# Patient Record
Sex: FEMALE | Race: WHITE
[De-identification: names, ages, dates, MRNs, and addresses within clinical notes are randomized per-mention and may not be internally consistent; named-entity substitution may affect disease eponyms.]

---

## 2018-01-16 ENCOUNTER — HOSPITAL ENCOUNTER (OUTPATIENT)
Dept: HOSPITAL 33 - ED | Age: 18
Setting detail: OBSERVATION
LOS: 2 days | Discharge: HOME | End: 2018-01-18
Attending: GENERAL PRACTICE | Admitting: GENERAL PRACTICE
Payer: SELF-PAY

## 2018-01-16 DIAGNOSIS — F19.90: ICD-10-CM

## 2018-01-16 DIAGNOSIS — R74.8: ICD-10-CM

## 2018-01-16 DIAGNOSIS — G47.00: ICD-10-CM

## 2018-01-16 DIAGNOSIS — Z72.0: ICD-10-CM

## 2018-01-16 DIAGNOSIS — R56.9: ICD-10-CM

## 2018-01-16 DIAGNOSIS — T50.904A: Primary | ICD-10-CM

## 2018-01-16 PROCEDURE — 82962 GLUCOSE BLOOD TEST: CPT

## 2018-01-16 PROCEDURE — 36000 PLACE NEEDLE IN VEIN: CPT

## 2018-01-16 PROCEDURE — 81000 URINALYSIS NONAUTO W/SCOPE: CPT

## 2018-01-16 PROCEDURE — 83735 ASSAY OF MAGNESIUM: CPT

## 2018-01-16 PROCEDURE — 80053 COMPREHEN METABOLIC PANEL: CPT

## 2018-01-16 PROCEDURE — 87086 URINE CULTURE/COLONY COUNT: CPT

## 2018-01-16 PROCEDURE — 95812 EEG 41-60 MINUTES: CPT

## 2018-01-16 PROCEDURE — 90791 PSYCH DIAGNOSTIC EVALUATION: CPT

## 2018-01-16 PROCEDURE — 93268 ECG RECORD/REVIEW: CPT

## 2018-01-16 PROCEDURE — 82550 ASSAY OF CK (CPK): CPT

## 2018-01-16 PROCEDURE — 99285 EMERGENCY DEPT VISIT HI MDM: CPT

## 2018-01-16 PROCEDURE — 36415 COLL VENOUS BLD VENIPUNCTURE: CPT

## 2018-01-16 PROCEDURE — 80307 DRUG TEST PRSMV CHEM ANLYZR: CPT

## 2018-01-16 PROCEDURE — 93306 TTE W/DOPPLER COMPLETE: CPT

## 2018-01-16 PROCEDURE — 84703 CHORIONIC GONADOTROPIN ASSAY: CPT

## 2018-01-16 PROCEDURE — 96361 HYDRATE IV INFUSION ADD-ON: CPT

## 2018-01-16 PROCEDURE — 71046 X-RAY EXAM CHEST 2 VIEWS: CPT

## 2018-01-16 PROCEDURE — 85025 COMPLETE CBC W/AUTO DIFF WBC: CPT

## 2018-01-16 PROCEDURE — 96360 HYDRATION IV INFUSION INIT: CPT

## 2018-01-16 PROCEDURE — 74018 RADEX ABDOMEN 1 VIEW: CPT

## 2018-01-16 PROCEDURE — 93005 ELECTROCARDIOGRAM TRACING: CPT

## 2018-01-16 PROCEDURE — 84484 ASSAY OF TROPONIN QUANT: CPT

## 2018-01-17 LAB
ALBUMIN SERPL-MCNC: 5.2 G/DL (ref 3.4–5)
ALP SERPL-CCNC: 80 U/L (ref 46–116)
ALT SERPL-CCNC: 26 U/L (ref 12–78)
AMORPH CRY URNS QL MICRO: (no result) /HPF
AMPHETAMINES UR QL: (no result)
ANION GAP SERPL CALC-SCNC: 15.3 MEQ/L (ref 5–15)
APAP SPEC-MCNC: < 2 UG/ML (ref 10–30)
AST SERPL QL: 21 U/L (ref 15–37)
BARBITURATES UR QL: (no result)
BASOPHILS # BLD AUTO: 0.02 10*3/UL (ref 0–0.4)
BASOPHILS NFR BLD AUTO: 0.2 % (ref 0–0.4)
BENZODIAZ UR QL SCN: (no result)
BILIRUB BLD-MCNC: 0.2 MG/DL (ref 0.2–1)
BUN SERPL-MCNC: 9 MG/DL (ref 9–20)
CALCIUM SPEC-MCNC: 9.4 MG/DL (ref 8.5–10.1)
CHLORIDE SERPL-SCNC: 104 MEQ/L (ref 98–107)
CK SERPL-CCNC: 45 U/L (ref 26–192)
CO2 SERPL-SCNC: 24.6 MEQ/L (ref 21–32)
COCAINE UR QL SCN: (no result)
CREAT SERPL-MCNC: 0.89 MG/DL (ref 0.55–1.3)
EOSINOPHIL # BLD AUTO: 0.02 10*3/UL (ref 0–0.5)
ETHANOL SERPL-MCNC: < 0.01 % (ref 0–0.01)
GFR SERPLBLD BASED ON 1.73 SQ M-ARVRAT: > 60 ML/MIN
GLUCOSE SERPL-MCNC: 148 MG/DL (ref 70–110)
GLUCOSE UR-MCNC: NEGATIVE MG/DL
GRANULOCYTES # BLD AUTO: 9.58 10*3/UL (ref 1.4–6.9)
HCT VFR BLD AUTO: 42.1 % (ref 35–47)
HGB BLD-MCNC: 14.6 GM/DL (ref 12–16)
LYMPHOCYTES # SPEC AUTO: 1.5 10*3/UL (ref 1–4.6)
MAGNESIUM SERPL-MCNC: 2 MG/DL (ref 1.8–2.4)
MCH RBC QN AUTO: 28 PG (ref 26–32)
MCHC RBC AUTO-ENTMCNC: 34.7 G/DL (ref 32–36)
METHADONE UR QL: (no result)
MONOCYTES # BLD AUTO: 0.54 10*3/UL (ref 0–1.3)
NEUTROPHILS NFR BLD AUTO: 82.1 % (ref 36–66)
OPIATES UR QL: (no result)
PCP UR QL CFM>20 NG/ML: (no result)
PLATELET # BLD AUTO: 291 K/MM3 (ref 150–450)
POTASSIUM SERPLBLD-SCNC: 3.6 MEQ/L (ref 3.5–5.1)
PROT SERPL-MCNC: 8.2 GM/DL (ref 6.4–8.2)
PROT UR STRIP-MCNC: 30 MG/DL
RBC # BLD AUTO: 5.22 M/MM3 (ref 4.1–5.4)
SALICYLATES SERPL-MCNC: < 2.8 MG/DL (ref 2.8–20)
SODIUM SERPL-SCNC: 140 MEQ/L (ref 136–145)
THC UR QL SCN: (no result)
TROPONIN T SERPL HS-MCNC: 0.05 NG/ML (ref 0–0.06)
WBC # BLD AUTO: 11.7 K/MM3 (ref 4–10.5)
WBC URNS QL MICRO: (no result) /HPF (ref 0–5)

## 2018-01-17 RX ADMIN — DEXTROSE AND SODIUM CHLORIDE SCH MLS/HR: 5; 450 INJECTION, SOLUTION INTRAVENOUS at 15:24

## 2018-01-17 NOTE — HP
HISTORY OF PRESENT ILLNESS: This is a 17 year-old lady without a doctor in the local area 
who presented to the emergency department by ambulance. The patient reports that around 
2200 hours when she was in a car waiting for her friend to get off work that she took five 
sleeping aid tablets. She is not exactly sure what was in them. She reports the bottle was 
in her friend's car. She reports they then pulled up to her friend's house who she was 
going to stay the night with and that she blacked out and had a seizure. According to the 
emergency room doctor's report they had been told that her arms and legs were jerking. The 
patient reports then she remembers waking up in the emergency department. The patient 
reports that she took five of the sleeping aides because they did not seem to have any 
effect and she had been having trouble sleeping. Her adopted mother is at the bedside as 
well as grandmother and a friend. Her adoptive mother states that she used to take one to 
two sleep aides at night to help her sleep when she lived with her. The patient denies any 
illicit drug use or taking other people's prescription medications until I discussed with 
her that the urine tox was positive for benzodiazepine and marijuana. She reports that she 
did take someone's Xanax but she said that was not last night and was a week ago. The 
patient reports that her heart felt funny in the emergency room but denies any chest pain 
now. The emergency room doctor ordered serial troponins and her second one came back 
slightly elevated. The nursing staff did call the cardiologist on call for further 
direction concerning this. The patient reports that she was not trying to hurt herself. 
She just has a lot of trouble sleeping. The patient does not really think she had a 
seizure three years ago. She said she was just shaky when that happened. She reports she 
does drive and has a 's license. 



REVIEW OF SYSTEMS:  No nausea or vomiting. No abdominal pain. No diarrhea. She has had a 
little bit of a headache and reports a history of migraine headache. No chest pain. No 
shortness of breath.  



PAST MEDICAL HISTORY:  Migraine headache. 



PAST SURGICAL HISTORY: Oral surgery.



MEDICATIONS:  None prescribed. 



ALLERGIES:  SULFA, TRIMETHOPRIM. 



SOCIAL HISTORY:  She reports she lives with a friend and her friend's mother. She smokes 
but is trying to quit. She denies illicit drug use however urine tox was positive for 
marijuana and benzodiazepine. 



FAMILY HISTORY:  Her mother is living and has anxiety problems. Her father is living and 
has no health problems. 



PHYSICAL EXAMINATION: 

VITAL SIGNS:  Temperature current 98.2F, temperature max 98.2F, heart rate 103 to 134, 
respiratory rate 16 to 20, blood pressure 123 to 145 over 80 to 97, weight 58.6 kg.  
Oxygen saturation 98 to 100% on room air. 

GENERAL: The patient is lying in bed a pleasant talkative lady in no acute distress again 
with three family members/friends at the bedside.    

CVS: Heart has a regular rate and rhythm. No murmurs, gallops or rubs are appreciated. 

CHEST: Clear to auscultation bilaterally. No crackles or wheezes. 

ABDOMEN:  Soft, nontender, nondistended with normal bowel sounds. 

EXTREMITIES: No clubbing, cyanosis or edema. 

SKIN: Warm, dry and intact. 

NEURO: She is alert and oriented and denies suicidal ideation.  



LABORATORY DATA AND TESTS:  White blood cell count 11,700. The first troponin was 0.018 
and then at 0319 hours was 0.166, at 0530 hours it was 0.122. EKG was sinus tachycardia 
with no ST-T wave changes. UA revealed moderate bacteria, 2-5 white blood cells. Urine 
culture in lab.  Urine tox positive for benzodiazepine and marijuana. 



ASSESSMENT AND PLAN:  

1) OVERDOSE: I will ask for tele-medicine mental health consult. The patient was counseled 
that she should only take any over-the-counter medicine as directed by the bottle. 

2) HISTORY OF A SEIZURE: Will try to get the emergency room ambulance run sheet to the 
chart and ask for tele-neurology consult and check an EEG. 

3) ILLICIT DRUG USE: The patient was counseled that she should not take other people's 
prescription medications. 

4) TOBACCO USE: The patient will be counseled that she should stop smoking. 

5) INSOMNIA. 

6) ELEVATED TROPONIN: Cardiologist, Dr. Montoya, has been consulted. Her troponins were 
trending down, this can be related to the tachycardia most likely secondary to drug 
overdose.

## 2018-01-17 NOTE — ERPHSYRPT
- History of Present Illness


Time Seen by Provider: 01/17/18 00:05


Source: patient


Exam Limitations: no limitations


Physician History: 





17-year-old white female who states she has had a seizure approximately 3 years 

ago.


Arrives with complaint of seizure activity just prior to arrival.


According to patient she took 5 sleep aid tablets at around 10:00.


She states she was not trying to kill her self however she does not have a good 

explanation as to why she took so many.





Patient is currently alert and oriented 3 initially refusing IV and blood.


However currently Will allow tests.


Past medical history includes seizures.


Patient states she has had a history of depression in the past.





Past surgical history patient denies





Social history positive for tobacco use she denies alcohol or illicit drug use


Timing/Duration: today


Severity: moderate


Modifying Factors: Improves With: nothing


Associated Symptoms: seizure, No nausea, No vomiting, No abdominal pain, No 

shortness of breath, No heartburn, No diaphoresis, No cough, No chills, No 

chest pain, No fever, No headaches, No loss of appetite, No malaise, No rash, 

No syncope, No weakness


Allergies/Adverse Reactions: 








sulfamethoxazole [From Bactrim] Adverse Reaction (Verified 04/19/15 12:15)


 


trimethoprim [From Bactrim] Adverse Reaction (Verified 04/19/15 12:15)


 





Home Medications: 








No Reportable Medications [No Reported Medications]  04/19/15 [History]








- Review of Systems


Constitutional: No Fever, No Chills


Eyes: No Symptoms


Ears, Nose, & Throat: No Symptoms


Respiratory: No Cough, No Dyspnea


Cardiac: No Chest Pain, No Edema, No Syncope


Abdominal/Gastrointestinal: No Abdominal Pain, No Nausea, No Vomiting, No 

Diarrhea


Genitourinary Symptoms: No Dysuria


Musculoskeletal: No Back Pain, No Neck Pain


Skin: No Rash


Neurological: Seizure, No Dizziness, No Focal Weakness, No Gait Changes, No 

Headache, No Irritability, No Lethargy, No Paralysis, No Parasthesia, No 

Sensory Changes, No Speech Changes, No Tics, No Tremors, No Vertigo


Psychological: No Symptoms


Endocrine: No Symptoms


All Other Systems: Reviewed and Negative





- Past Medical History


Pertinent Past Medical History: No





- Past Surgical History


Past Surgical History: No





- Social History


Smoking Status: Never smoker


Exposure to second hand smoke: No


Drug Use: none


Patient Lives Alone: No





- Nursing Vital Signs


Nursing Vital Signs: 


 Initial Vital Signs











Temperature  98 F   01/16/18 23:55


 


Pulse Rate  134 H  01/16/18 23:55


 


Respiratory Rate  16   01/16/18 23:55


 


Blood Pressure  139/82   01/16/18 23:55


 


O2 Sat by Pulse Oximetry  100   01/16/18 23:55








 Pain Scale











Pain Intensity                 0

















- Physical Exam


General Appearance: no apparent distress, alert


Eye Exam: PERRL/EOMI, eyes nml inspection


Ears, Nose, Throat Exam: normal ENT inspection, TMs normal, pharynx normal, 

moist mucous membranes


Neck Exam: normal inspection, non-tender, supple, full range of motion


Respiratory Exam: normal breath sounds, lungs clear, No respiratory distress


Cardiovascular Exam: normal heart sounds, normal peripheral pulses, tachycardia

, capillary refill <2 sec


Gastrointestinal/Abdomen Exam: soft, normal bowel sounds, No tenderness, No mass


Back Exam: normal inspection, normal range of motion, No CVA tenderness, No 

vertebral tenderness


Extremity Exam: normal inspection, normal range of motion, pelvis stable


Neurologic Exam: alert, oriented x 3, cooperative, normal mood/affect, nml 

cerebellar function, nml station & gait, sensation nml, No motor deficits


Skin Exam: normal color, warm, dry, No rash


Lymphatic Exam: No adenopathy


**SpO2 Interpretation**: normal





- Course


Nursing assessment & vital signs reviewed: Yes


EKG Interpreted by Me: RATE (128 bpm), Sinus Tach, NORMAL AXIS, Other (EKG 

sinus tachycardia with PVC, normal axis, no acute ST or T wave changes noted)


Ordered Tests: 


 Active Orders 24 hr











 Category Date Time Status


 


 Accucheck STAT Care  01/17/18 00:04 Active


 


 EKG-ER Only STAT Care  01/17/18 00:04 Active


 


 IV Insertion STAT Care  01/17/18 00:04 Active


 


 ACETAMINOPHEN Stat Lab  01/17/18 00:37 Completed


 


 CBC W DIFF Stat Lab  01/17/18 00:37 Completed


 


 CK-Creatinine Phosphokinase Stat Lab  01/17/18 00:37 Completed


 


 CMP Stat Lab  01/17/18 00:37 Completed


 


 CULTURE,URINE Stat Lab  01/17/18 01:15 Received


 


 ETHYL ALCOHOL Stat Lab  01/17/18 00:37 Completed


 


 HCG QUALITATIVE,SERUM Stat Lab  01/17/18 00:37 Completed


 


 SALICYLATE Stat Lab  01/17/18 00:37 Completed


 


 TROPONIN Q3H Lab  01/17/18 00:37 Completed


 


 TROPONIN Q3H Lab  01/17/18 03:15 Ordered


 


 TROPONIN Q3H Lab  01/17/18 06:15 Ordered


 


 TROPONIN Q3H Lab  01/17/18 09:15 Ordered


 


 TROPONIN Q3H Lab  01/17/18 12:15 Ordered


 


 UA W/ MICROSCOPIC Stat Lab  01/17/18 01:15 Completed


 


 Urine Triage Profile Stat Lab  01/17/18 01:15 Completed








Medication Summary














Discontinued Medications














Generic Name Dose Route Start Last Admin





  Trade Name Freq  PRN Reason Stop Dose Admin


 


Sodium Chloride  1,000 mls @ 999 mls/hr  01/17/18 00:04  01/17/18 00:50





  Sodium Chloride 0.9% 1000 Ml  IV  01/17/18 01:04  999 mls/hr





  .Q1H1M STA   Administration











Lab/Rad Data: 


 Laboratory Result Diagrams





 01/17/18 00:37 





 01/17/18 00:37 





 Laboratory Results











  01/17/18 01/17/18 01/17/18 Range/Units





  01:15 01:15 00:37 


 


WBC     (4.0-10.5)  K/mm3


 


RBC     (4.1-5.4)  M/mm3


 


Hgb     (12.0-16.0)  gm/dl


 


Hct     (35-47)  %


 


MCV     ()  fl


 


MCH     (26-32)  pg


 


MCHC     (32-36)  g/dl


 


RDW     (11.5-14.0)  %


 


Plt Count     (150-450)  K/mm3


 


MPV     (6-9.5)  fl


 


Gran %     (36.0-66.0)  %


 


Lymphocytes %     (24.0-44.0)  %


 


Monocytes %     (0.0-12.0)  %


 


Eosinophils %     (0.00-5.0)  %


 


Basophils %     (0.0-0.4)  %


 


Basophils #     (0-0.4)  


 


Sodium     (136-145)  mEq/L


 


Potassium     (3.5-5.1)  mEq/L


 


Chloride     ()  mEq/L


 


Carbon Dioxide     (21-32)  mEq/L


 


Anion Gap     (5-15)  MEQ/L


 


BUN     (9-20)  mg/dL


 


Creatinine     (0.55-1.30)  mg/dl


 


Estimated GFR     ML/MIN


 


Glucose     ()  MG/DL


 


Calcium     (8.5-10.1)  mg/dL


 


Total Bilirubin     (0.2-1.0)  mg/dL


 


AST     (15-37)  U/L


 


ALT     (12-78)  U/L


 


Alkaline Phosphatase     ()  U/L


 


Creatine Kinase     ()  U/L


 


Troponin I    0.018  (0.000-0.056)  ng/ml


 


Serum Total Protein     (6.4-8.2)  gm/dL


 


Albumin     (3.4-5.0)  g/dL


 


Serum Pregnancy, Qual     (Negative)  


 


Ur Collection Type   VOID   


 


Urine Color   YELLOW   (YELLOW)  


 


Urine Appearance   CLOUDY   (CLEAR)  


 


Urine pH   7.0   (5-6)  


 


Ur Specific Gravity   1.020   (1.005-1.025)  


 


Urine Protein   30   (Negative)  


 


Urine Ketones   NEGATIVE   (NEGATIVE)  


 


Urine Blood   NEGATIVE   (0-5)  David/ul


 


Urine Nitrite   NEGATIVE   (NEGATIVE)  


 


Urine Bilirubin   NEGATIVE   (NEGATIVE)  


 


Urine Urobilinogen   NORMAL   (0-1)  mg/dL


 


Ur Leukocyte Esterase   1+   (NEGATIVE)  


 


Urine Microscopic RBC   0-2   (0-2)  /HPF


 


Urine Microscopic WBC   2-5   (0-5)  /HPF


 


Ur Epithelial Cells   MODERATE   (FEW)  /HPF


 


Amorphous Crystals   MANY   (NEGATIVE)  /HPF


 


Urine Bacteria   MODERATE   (NEGATIVE)  /HPF


 


Urine Mucus   MANY   (NEGATIVE)  /HPF


 


Urine Culture Reflexed   YES   (NO)  


 


Urine Glucose   NEGATIVE   (NEGATIVE)  mg/dL


 


Salicylates     (2.8-20.0)  mg/dl


 


Urine Opiates Level  NEG.    (NEGATIVE)  


 


Ur Methadone  NEG.    (NEGATIVE)  


 


Acetaminophen     (10-30)  ug/ml


 


Urine Barbiturates  NEG.    (NEGATIVE)  


 


Ur Phencyclidine (PCP)  NEG.    (NEGATIVE)  


 


Urine Amphetamine  NEG.    (NEGATIVE)  


 


U Benzodiazepine Level  POS.    (NEGATIVE)  


 


Urine Cocaine  NEG.    (NEGATIVE)  


 


Urine Marijuana (THC)  POS.    (NEGATIVE)  


 


Ethyl Alcohol     (0.00-0.01)  %


 


Specimen Received   1/17/18 0115   














  01/17/18 01/17/18 01/17/18 Range/Units





  00:37 00:37 00:37 


 


WBC    11.7 H  (4.0-10.5)  K/mm3


 


RBC    5.22  (4.1-5.4)  M/mm3


 


Hgb    14.6  (12.0-16.0)  gm/dl


 


Hct    42.1  (35-47)  %


 


MCV    80.7  ()  fl


 


MCH    28.0  (26-32)  pg


 


MCHC    34.7  (32-36)  g/dl


 


RDW    12.7  (11.5-14.0)  %


 


Plt Count    291  (150-450)  K/mm3


 


MPV    9.7 H  (6-9.5)  fl


 


Gran %    82.1 H  (36.0-66.0)  %


 


Lymphocytes %    12.9 L  (24.0-44.0)  %


 


Monocytes %    4.6  (0.0-12.0)  %


 


Eosinophils %    0.2  (0.00-5.0)  %


 


Basophils %    0.2  (0.0-0.4)  %


 


Basophils #    0.02  (0-0.4)  


 


Sodium   140   (136-145)  mEq/L


 


Potassium   3.6   (3.5-5.1)  mEq/L


 


Chloride   104   ()  mEq/L


 


Carbon Dioxide   24.6   (21-32)  mEq/L


 


Anion Gap   15.3 H   (5-15)  MEQ/L


 


BUN   9   (9-20)  mg/dL


 


Creatinine   0.89   (0.55-1.30)  mg/dl


 


Estimated GFR   > 60   ML/MIN


 


Glucose   148 H   ()  MG/DL


 


Calcium   9.4   (8.5-10.1)  mg/dL


 


Total Bilirubin   0.20   (0.2-1.0)  mg/dL


 


AST   21   (15-37)  U/L


 


ALT   26   (12-78)  U/L


 


Alkaline Phosphatase   80   ()  U/L


 


Creatine Kinase   45   ()  U/L


 


Troponin I     (0.000-0.056)  ng/ml


 


Serum Total Protein   8.2   (6.4-8.2)  gm/dL


 


Albumin   5.2 H   (3.4-5.0)  g/dL


 


Serum Pregnancy, Qual  NEGATIVE    (Negative)  


 


Ur Collection Type     


 


Urine Color     (YELLOW)  


 


Urine Appearance     (CLEAR)  


 


Urine pH     (5-6)  


 


Ur Specific Gravity     (1.005-1.025)  


 


Urine Protein     (Negative)  


 


Urine Ketones     (NEGATIVE)  


 


Urine Blood     (0-5)  David/ul


 


Urine Nitrite     (NEGATIVE)  


 


Urine Bilirubin     (NEGATIVE)  


 


Urine Urobilinogen     (0-1)  mg/dL


 


Ur Leukocyte Esterase     (NEGATIVE)  


 


Urine Microscopic RBC     (0-2)  /HPF


 


Urine Microscopic WBC     (0-5)  /HPF


 


Ur Epithelial Cells     (FEW)  /HPF


 


Amorphous Crystals     (NEGATIVE)  /HPF


 


Urine Bacteria     (NEGATIVE)  /HPF


 


Urine Mucus     (NEGATIVE)  /HPF


 


Urine Culture Reflexed     (NO)  


 


Urine Glucose     (NEGATIVE)  mg/dL


 


Salicylates   < 2.8 L   (2.8-20.0)  mg/dl


 


Urine Opiates Level     (NEGATIVE)  


 


Ur Methadone     (NEGATIVE)  


 


Acetaminophen   < 2.0 L   (10-30)  ug/ml


 


Urine Barbiturates     (NEGATIVE)  


 


Ur Phencyclidine (PCP)     (NEGATIVE)  


 


Urine Amphetamine     (NEGATIVE)  


 


U Benzodiazepine Level     (NEGATIVE)  


 


Urine Cocaine     (NEGATIVE)  


 


Urine Marijuana (THC)     (NEGATIVE)  


 


Ethyl Alcohol   < 0.010   (0.00-0.01)  %


 


Specimen Received     














- Progress


Progress: improved


Progress Note: 





01/17/18 02:23


This is a 17-year-old white female with history of seizure activity to 3 years 

ago.


Who was brought in secondary to seizure activity at home after taking 5 sleep 

aid tablets.


Patient states she took these just because one wasn't working she still denies 

trying to harm herself.


On arrival patient was tachycardic patient was given IV normal saline.


Appropriate labs were ordered poison control was consult and they recommended 

the patient be observed for 6 hours.


Patient's acetaminophen level salicylate levels are within normal limits.


I discussed case with Dr. palacio who is on call for the hospital.


She has agreed to take patient on observation .


Will go ahead and place patient on observation continue IV fluids telemetry.


Will repeat acetaminophen level IV hours after last draw.


Will order a psych consult for the patient.








- Departure


Time of Disposition: 02:25


Departure Disposition: Observation


Clinical Impression: 


 Witnessed seizure-like activity





Overdose


Qualifiers:


 Encounter type: initial encounter Injury intent: undetermined intent Qualified 

Code(s): T50.904A - Poisoning by unspecified drugs, medicaments and biological 

substances, undetermined, initial encounter





Condition: Fair


Critical Care Time: No


Referrals: 


FERNANDO ASHBY DO [Primary Care Provider] -

## 2018-01-18 VITALS — OXYGEN SATURATION: 97 % | HEART RATE: 84 BPM | DIASTOLIC BLOOD PRESSURE: 56 MMHG | SYSTOLIC BLOOD PRESSURE: 104 MMHG

## 2018-01-18 RX ADMIN — DEXTROSE AND SODIUM CHLORIDE SCH MLS/HR: 5; 450 INJECTION, SOLUTION INTRAVENOUS at 01:22

## 2018-01-18 RX ADMIN — DEXTROSE AND SODIUM CHLORIDE SCH MLS/HR: 5; 450 INJECTION, SOLUTION INTRAVENOUS at 11:28

## 2018-01-18 NOTE — XRAY
Indication: Foreign body ingestion.



Comparison: None



PA/lateral chest demonstrates normal heart, lungs, and bony thorax with

anatomic variant for azygos lobe.  No radiopaque foreign body.

## 2018-01-18 NOTE — XRAY
Indication: Foreign body ingestion.



Comparison: None



KUB demonstrates 6 x 25 mm metallic foreign body in the left upper quadrant of

the abdomen probably in the stomach.  No large free air.  Bowel gas pattern

nonobstructed with mild diffuse scattered colonic fecal debris.  Solid organs

and osseous structures unremarkable.



Impression: Left upper quadrant foreign body presumed in the stomach without

complications.  Mild fecal stasis.

## 2018-01-18 NOTE — PCM.NOTE
Date and Time: 01/18/18  1155





Subjective Assessment: 





Patient denies any pain or concerns.  Adoptive mother, grandmother and friend 

at bedside.  They reports when she is discharged she will go home with her 

grandmother who can watch her for 24 hours. Reviewed that the neurologist does 

not want her to drive until she is seen by her PCP whom the family report is 

Dr. Lincoln Mahan or a neurologist.  She denies any chest pain or shortness of 

breath.





- Review of Systems


Constitutional: No Symptoms


Eyes: No Symptoms


Ears, Nose, & Throat: No Symptoms


Respiratory: No Symptoms


Cardiac: No Symptoms


Abdominal/Gastrointestinal: No Symptoms


Genitourinary Symptoms: No Symptoms


Musculoskeletal: No Symptoms


Skin: No Symptoms


Neurological: No Symptoms


Psychological: No Symptoms





Objective Exam


General Appearance: no apparent distress


Neurologic Exam: alert, cooperative, CNs II-XII nml as tested, normal mood/

affect, nml cerebellar function, nml station & gait, other (strength 5/5 in 4 

ext), No motor weakness, No facial droop, No slurred speech


Skin Exam: normal color, warm, dry, No rash


Respiratory Exam: normal breath sounds, lungs clear


Cardiovascular Exam: regular rate/rhythm, normal heart sounds, No murmur, No 

friction rub, No gallop


Gastrointestinal/Abdomen Exam: soft, normal bowel sounds, No tenderness, No 

distention, No mass, No guarding


Extremity Exam: other (no c/c/e)





OBJECTIVE DATA


Vital Signs: 


 Vital Signs - 24 hr











  Temp Pulse Resp BP Pulse Ox


 


 01/18/18 11:27  98.4 F  80  16  95/53  98


 


 01/18/18 07:41  98.5 F  82  16  99/65  91 L


 


 01/18/18 04:00  98.0 F  88  15  114/57  98


 


 01/18/18 00:00  98.8 F  92 H  18  113/62  99


 


 01/17/18 20:00  98.3 F  97 H  19  96/52  98


 


 01/17/18 16:29  97.4 F  91 H  16  117/71  99


 


 01/17/18 16:00  98.4 F  83  16  115/64  99








 Pain Assessment - Last Documented











Pain Intensity                 0


 


Pain Scale Used                0-10 Pain Scale











Intake and Output: 


 Intake & Output











 01/16/18 01/17/18 01/18/18 01/19/18





 06:59 06:59 06:59 06:59


 


Intake Total   4112 240


 


Balance   4112 240


 


Weight  56 kg 59.9 kg 











Lab Results: 


 Lab Results-Last 24 Hours











  01/17/18 Range/Units





  12:14 


 


Troponin I  0.021  (0.000-0.056)  ng/ml











Radiology Exams: 


 Radiology Procedures











 Category Date Time Status


 


 ECHO W/2D AND DOPPLER [US] Routine Exams  01/17/18 15:19 Taken


 


 MRI BRAIN WITH CONTRAST [MRI] Routine Exams  01/18/18 11:53 Ordered














Assessment/Plan


(1) Overdose


Current Visit: Yes   Status: Acute   


Qualifiers: 


   Encounter type: initial encounter   Injury intent: undetermined intent   

Qualified Code(s): T50.904A - Poisoning by unspecified drugs, medicaments and 

biological substances, undetermined, initial encounter   


Assessment & Plan: 


She has been seen by [facility] and they recommend giving her information 

about outpatient counseling and do not think she needs inpatient care.


Code(s): T50.901A - POISONING BY UNSP DRUG/MEDS/BIOL SUBST, ACCIDENTAL, INIT   





(2) Witnessed seizure-like activity


Current Visit: Yes   Status: Acute   


Assessment & Plan: 


She has been seen by the tele-neurologist. Will check MRI of the brain with 

contrast per their recommendations either today if this can be done or within 

this coming week. They do not recommend starting seizure medicine but do not 

want her to drive or take a bath or swim.   


Code(s): R56.9 - UNSPECIFIED CONVULSIONS   





(3) Illicit drug use


Current Visit: Yes   Status: Acute   


Assessment & Plan: 


Patient counseled yesterday that she should not use THC or take other peoples 

prescription medications.


Code(s): F19.90 - OTHER PSYCHOACTIVE SUBSTANCE USE, UNSPECIFIED, UNCOMPLICATED 

  





(4) Tobacco abuse


Current Visit: Yes   Status: Acute   Code(s): Z72.0 - TOBACCO USE   





(5) Insomnia


Current Visit: Yes   Status: Acute   Code(s): G47.00 - INSOMNIA, UNSPECIFIED   





(6) Elevated troponin


Current Visit: Yes   Status: Acute   


Assessment & Plan: 


Cardiologist consulted and he plans to see her today. her troponin is normal 

now.


Code(s): R74.8 - ABNORMAL LEVELS OF OTHER SERUM ENZYMES

## 2018-01-19 NOTE — ECHO
Transthoracic echocardiographic examination and color Doppler was done on 01/17/2018. 



INDICATION:  Elevated troponin I and tachycardia.       



IMPRESSION: 

1) NO REGIONAL WALL MOTION ABNORMALITY. ESTIMATED GLOBAL LEFT VENTRICULAR EJECTION 
FRACTION OF AROUND 60-70%. 

2) TRACE PULMONIC INSUFFICIENCY.  



The left ventricle was visualized and demonstrated adequate motion of all the segments. 
Estimated global left ventricular ejection fraction around 60-70%. The left ventricular 
thickness is normal. The mitral valve is seen and this opens adequately. No significant 
mitral regurgitation is seen. Left atrium is normal. The aortic valve opens adequately. 
The right side chambers are normal. The right ventricular systolic pressure is normal. 
There is trace pulmonic insufficiency.

## 2018-01-19 NOTE — CONS
CONSULT DATE:  01/18/2018



BRIEF HISTORY:  This is a 17 year-old female was seen because of tachycardia and mildly 
elevated troponin I. 



The patient was admitted primarily after she took five tablets of sleep aid. She presented 
to the emergency room and on initial evaluation she had sinus tachycardia with troponin I 
mildly elevated. She denies any chest pains. She apparently had some "seizure activity".  
The patient has never had any cardiac related problems. She has been fairly active with no 
limitation in exercise capacity. 



CARDIAC RISK FACTORS:  Negative for diabetes. No hypertension. She smokes about two 
cigarettes a day. No known hyperlipidemia. 



FAMILY HISTORY: Negative for premature coronary artery disease.



REVIEW OF SYSTEMS:  CNS: There is no history of stroke. No seizures. Chronic headache. 
RESPIRATORY:  Chronic cough. No hemoptysis.  GI: No history of peptic ulcer. :  Negative 
for dysuria or hematuria. PERIPHERAL VASCULAR: No history of deep venous thrombosis. 



PAST MEDICAL HISTORY: Included migraine headaches. 



PAST SURGICAL HISTORY:   Included some dental procedures. 



CURRENT MEDICATIONS: Aspirin. 



PHYSICAL EXAMINATION: Her blood pressure is 114/57, heart rate 88, respirations 14. 

GENERAL:  The patient is a young female who is alert, oriented, who is not in any form of 
distress. 

HEENT:  Unremarkable. 

NECK:  No significant JVD. No carotid bruit. 

CHEST:  The breath sounds are clear. 

CARDIAC:  Heart tones are normal. There is no audible gallop. The rhythm is regular. 

ABDOMEN: Soft with normal bowel sounds. No bruit.

EXTREMITIES: No significant edema. Good distal pulses. 



LAB DATA AND DIAGNOSTIC TESTS: The highest troponin I was 0.122 which is trending down. 
The EKG on admission showed sinus tachycardia with isolated PVC's. 



IMPRESSION: In essence the elevated troponin I is most likely related to the sinus 
tachycardia and there is also supply and demand mismatch. She had no anginal episodes. No 
congestive symptoms. I would continue with primary intervention particularly for her to 
quit smoking. Her echocardiogram showed a normal left ventricular systolic function. I 
would be glad to follow her up in the clinic.